# Patient Record
Sex: MALE | Race: WHITE
[De-identification: names, ages, dates, MRNs, and addresses within clinical notes are randomized per-mention and may not be internally consistent; named-entity substitution may affect disease eponyms.]

---

## 2018-08-06 ENCOUNTER — HOSPITAL ENCOUNTER (EMERGENCY)
Dept: HOSPITAL 8 - ED | Age: 68
Discharge: HOME | End: 2018-08-06
Payer: COMMERCIAL

## 2018-08-06 VITALS — WEIGHT: 206.79 LBS | HEIGHT: 72 IN | BODY MASS INDEX: 28.01 KG/M2

## 2018-08-06 VITALS — DIASTOLIC BLOOD PRESSURE: 94 MMHG | SYSTOLIC BLOOD PRESSURE: 148 MMHG

## 2018-08-06 DIAGNOSIS — Z88.8: ICD-10-CM

## 2018-08-06 DIAGNOSIS — Z88.0: ICD-10-CM
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
## 2018-08-06 DIAGNOSIS — Z87.891: ICD-10-CM

## 2018-08-06 DIAGNOSIS — I10: ICD-10-CM

## 2018-08-06 DIAGNOSIS — Z88.2: ICD-10-CM

## 2018-08-06 DIAGNOSIS — S90.571A: Primary | ICD-10-CM
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
## 2018-08-06 PROCEDURE — 99284 EMERGENCY DEPT VISIT MOD MDM: CPT

## 2020-07-25 ENCOUNTER — HOSPITAL ENCOUNTER (OUTPATIENT)
Dept: HOSPITAL 8 - ED | Age: 70
Setting detail: OBSERVATION
LOS: 1 days | Discharge: HOME | End: 2020-07-26
Attending: INTERNAL MEDICINE | Admitting: INTERNAL MEDICINE
Payer: COMMERCIAL

## 2020-07-25 VITALS — DIASTOLIC BLOOD PRESSURE: 94 MMHG | SYSTOLIC BLOOD PRESSURE: 151 MMHG

## 2020-07-25 VITALS — SYSTOLIC BLOOD PRESSURE: 134 MMHG | DIASTOLIC BLOOD PRESSURE: 86 MMHG

## 2020-07-25 VITALS — BODY MASS INDEX: 26.28 KG/M2 | WEIGHT: 194.01 LBS | HEIGHT: 72 IN

## 2020-07-25 DIAGNOSIS — Z87.891: ICD-10-CM

## 2020-07-25 DIAGNOSIS — N13.2: ICD-10-CM

## 2020-07-25 DIAGNOSIS — G47.33: ICD-10-CM

## 2020-07-25 DIAGNOSIS — M10.9: ICD-10-CM

## 2020-07-25 DIAGNOSIS — D80.1: ICD-10-CM

## 2020-07-25 DIAGNOSIS — Z87.442: ICD-10-CM

## 2020-07-25 DIAGNOSIS — Z79.899: ICD-10-CM

## 2020-07-25 DIAGNOSIS — Z86.61: ICD-10-CM

## 2020-07-25 DIAGNOSIS — R53.82: ICD-10-CM

## 2020-07-25 DIAGNOSIS — E03.9: ICD-10-CM

## 2020-07-25 DIAGNOSIS — Z88.0: ICD-10-CM

## 2020-07-25 DIAGNOSIS — Z03.818: Primary | ICD-10-CM

## 2020-07-25 DIAGNOSIS — E16.2: ICD-10-CM

## 2020-07-25 DIAGNOSIS — I10: ICD-10-CM

## 2020-07-25 LAB
ALBUMIN SERPL-MCNC: 3.5 G/DL (ref 3.4–5)
ALP SERPL-CCNC: 74 U/L (ref 45–117)
ALT SERPL-CCNC: 29 U/L (ref 12–78)
ANION GAP SERPL CALC-SCNC: 1 MMOL/L (ref 5–15)
BASOPHILS # BLD AUTO: 0.02 X10^3/UL (ref 0–0.1)
BASOPHILS NFR BLD AUTO: 0 % (ref 0–1)
BILIRUB SERPL-MCNC: 1 MG/DL (ref 0.2–1)
CALCIUM SERPL-MCNC: 8.8 MG/DL (ref 8.5–10.1)
CHLORIDE SERPL-SCNC: 110 MMOL/L (ref 98–107)
CREAT SERPL-MCNC: 0.82 MG/DL (ref 0.7–1.3)
EOSINOPHIL # BLD AUTO: 0.15 X10^3/UL (ref 0–0.4)
EOSINOPHIL NFR BLD AUTO: 3 % (ref 1–7)
ERYTHROCYTE [DISTWIDTH] IN BLOOD BY AUTOMATED COUNT: 14.5 % (ref 9.4–14.8)
LYMPHOCYTES # BLD AUTO: 0.79 X10^3/UL (ref 1–3.4)
LYMPHOCYTES NFR BLD AUTO: 15 % (ref 22–44)
MCH RBC QN AUTO: 31.7 PG (ref 27.5–34.5)
MCHC RBC AUTO-ENTMCNC: 33 G/DL (ref 33.2–36.2)
MCV RBC AUTO: 96.2 FL (ref 81–97)
MD: NO
MICROSCOPIC: (no result)
MONOCYTES # BLD AUTO: 0.51 X10^3/UL (ref 0.2–0.8)
MONOCYTES NFR BLD AUTO: 10 % (ref 2–9)
NEUTROPHILS # BLD AUTO: 3.77 X10^3/UL (ref 1.8–6.8)
NEUTROPHILS NFR BLD AUTO: 72 % (ref 42–75)
PLATELET # BLD AUTO: 182 X10^3/UL (ref 130–400)
PMV BLD AUTO: 7.1 FL (ref 7.4–10.4)
PROT SERPL-MCNC: 6.7 G/DL (ref 6.4–8.2)
RBC # BLD AUTO: 4.53 X10^6/UL (ref 4.38–5.82)

## 2020-07-25 PROCEDURE — 96365 THER/PROPH/DIAG IV INF INIT: CPT

## 2020-07-25 PROCEDURE — G0378 HOSPITAL OBSERVATION PER HR: HCPCS

## 2020-07-25 PROCEDURE — 36415 COLL VENOUS BLD VENIPUNCTURE: CPT

## 2020-07-25 PROCEDURE — C1769 GUIDE WIRE: HCPCS

## 2020-07-25 PROCEDURE — 87086 URINE CULTURE/COLONY COUNT: CPT

## 2020-07-25 PROCEDURE — 93005 ELECTROCARDIOGRAM TRACING: CPT

## 2020-07-25 PROCEDURE — 81001 URINALYSIS AUTO W/SCOPE: CPT

## 2020-07-25 PROCEDURE — 87635 SARS-COV-2 COVID-19 AMP PRB: CPT

## 2020-07-25 PROCEDURE — 52356 CYSTO/URETERO W/LITHOTRIPSY: CPT

## 2020-07-25 PROCEDURE — 80053 COMPREHEN METABOLIC PANEL: CPT

## 2020-07-25 PROCEDURE — 76000 FLUOROSCOPY <1 HR PHYS/QHP: CPT

## 2020-07-25 PROCEDURE — 74176 CT ABD & PELVIS W/O CONTRAST: CPT

## 2020-07-25 PROCEDURE — C2617 STENT, NON-COR, TEM W/O DEL: HCPCS

## 2020-07-25 PROCEDURE — 74018 RADEX ABDOMEN 1 VIEW: CPT

## 2020-07-25 PROCEDURE — 99285 EMERGENCY DEPT VISIT HI MDM: CPT

## 2020-07-25 PROCEDURE — 85025 COMPLETE CBC W/AUTO DIFF WBC: CPT

## 2020-07-25 RX ADMIN — THYROID, PORCINE SCH MG: 30 TABLET ORAL at 21:41

## 2020-07-25 RX ADMIN — CARVEDILOL SCH MG: 12.5 TABLET, FILM COATED ORAL at 21:42

## 2020-07-25 RX ADMIN — HYDROCODONE BITARTRATE AND ACETAMINOPHEN PRN TAB: 5; 325 TABLET ORAL at 23:29

## 2020-07-26 VITALS — SYSTOLIC BLOOD PRESSURE: 111 MMHG | DIASTOLIC BLOOD PRESSURE: 70 MMHG

## 2020-07-26 VITALS — DIASTOLIC BLOOD PRESSURE: 79 MMHG | SYSTOLIC BLOOD PRESSURE: 129 MMHG

## 2020-07-26 VITALS — SYSTOLIC BLOOD PRESSURE: 118 MMHG | DIASTOLIC BLOOD PRESSURE: 66 MMHG

## 2020-07-26 VITALS — DIASTOLIC BLOOD PRESSURE: 68 MMHG | SYSTOLIC BLOOD PRESSURE: 109 MMHG

## 2020-07-26 VITALS — DIASTOLIC BLOOD PRESSURE: 69 MMHG | SYSTOLIC BLOOD PRESSURE: 113 MMHG

## 2020-07-26 LAB
ALBUMIN SERPL-MCNC: 3.6 G/DL (ref 3.4–5)
ALP SERPL-CCNC: 82 U/L (ref 45–117)
ALT SERPL-CCNC: 32 U/L (ref 12–78)
ANION GAP SERPL CALC-SCNC: 9 MMOL/L (ref 5–15)
BASOPHILS # BLD AUTO: 0 X10^3/UL (ref 0–0.1)
BASOPHILS NFR BLD AUTO: 0 % (ref 0–1)
BILIRUB SERPL-MCNC: 0.9 MG/DL (ref 0.2–1)
CALCIUM SERPL-MCNC: 8.9 MG/DL (ref 8.5–10.1)
CHLORIDE SERPL-SCNC: 106 MMOL/L (ref 98–107)
CREAT SERPL-MCNC: 1.06 MG/DL (ref 0.7–1.3)
EOSINOPHIL # BLD AUTO: 0 X10^3/UL (ref 0–0.4)
EOSINOPHIL NFR BLD AUTO: 0 % (ref 1–7)
ERYTHROCYTE [DISTWIDTH] IN BLOOD BY AUTOMATED COUNT: 14.7 % (ref 9.4–14.8)
LYMPHOCYTES # BLD AUTO: 0.43 X10^3/UL (ref 1–3.4)
LYMPHOCYTES NFR BLD AUTO: 6 % (ref 22–44)
MCH RBC QN AUTO: 31.8 PG (ref 27.5–34.5)
MCHC RBC AUTO-ENTMCNC: 33.3 G/DL (ref 33.2–36.2)
MCV RBC AUTO: 95.5 FL (ref 81–97)
MD: NO
MONOCYTES # BLD AUTO: 0.07 X10^3/UL (ref 0.2–0.8)
MONOCYTES NFR BLD AUTO: 1 % (ref 2–9)
NEUTROPHILS # BLD AUTO: 6.83 X10^3/UL (ref 1.8–6.8)
NEUTROPHILS NFR BLD AUTO: 93 % (ref 42–75)
PLATELET # BLD AUTO: 176 X10^3/UL (ref 130–400)
PMV BLD AUTO: 7.7 FL (ref 7.4–10.4)
PROT SERPL-MCNC: 7.2 G/DL (ref 6.4–8.2)
RBC # BLD AUTO: 4.9 X10^6/UL (ref 4.38–5.82)

## 2020-07-26 RX ADMIN — HYDROCODONE BITARTRATE AND ACETAMINOPHEN PRN TAB: 5; 325 TABLET ORAL at 00:02

## 2020-07-26 RX ADMIN — THYROID, PORCINE SCH MG: 30 TABLET ORAL at 08:10

## 2020-07-26 RX ADMIN — HYDROCODONE BITARTRATE AND ACETAMINOPHEN PRN TAB: 5; 325 TABLET ORAL at 04:05

## 2020-07-26 RX ADMIN — CARVEDILOL SCH MG: 12.5 TABLET, FILM COATED ORAL at 08:11

## 2020-08-12 ENCOUNTER — HOSPITAL ENCOUNTER (EMERGENCY)
Dept: HOSPITAL 8 - ED | Age: 70
Discharge: HOME | End: 2020-08-12
Payer: COMMERCIAL

## 2020-08-12 VITALS — HEIGHT: 72 IN | WEIGHT: 200.4 LBS | BODY MASS INDEX: 27.14 KG/M2

## 2020-08-12 VITALS — SYSTOLIC BLOOD PRESSURE: 122 MMHG | DIASTOLIC BLOOD PRESSURE: 77 MMHG

## 2020-08-12 DIAGNOSIS — I10: ICD-10-CM

## 2020-08-12 DIAGNOSIS — I49.3: ICD-10-CM

## 2020-08-12 DIAGNOSIS — N23: ICD-10-CM

## 2020-08-12 DIAGNOSIS — R11.2: ICD-10-CM

## 2020-08-12 DIAGNOSIS — N20.0: Primary | ICD-10-CM

## 2020-08-12 DIAGNOSIS — Z90.89: ICD-10-CM

## 2020-08-12 DIAGNOSIS — R30.0: ICD-10-CM

## 2020-08-12 LAB
ALBUMIN SERPL-MCNC: 4 G/DL (ref 3.4–5)
ALP SERPL-CCNC: 76 U/L (ref 45–117)
ALT SERPL-CCNC: 35 U/L (ref 12–78)
ANION GAP SERPL CALC-SCNC: 4 MMOL/L (ref 5–15)
BASOPHILS # BLD AUTO: 0 X10^3/UL (ref 0–0.1)
BASOPHILS NFR BLD AUTO: 0 % (ref 0–1)
BILIRUB SERPL-MCNC: 1.2 MG/DL (ref 0.2–1)
CALCIUM SERPL-MCNC: 9.1 MG/DL (ref 8.5–10.1)
CHLORIDE SERPL-SCNC: 110 MMOL/L (ref 98–107)
CREAT SERPL-MCNC: 1.24 MG/DL (ref 0.7–1.3)
EOSINOPHIL # BLD AUTO: 0.03 X10^3/UL (ref 0–0.4)
EOSINOPHIL NFR BLD AUTO: 1 % (ref 1–7)
ERYTHROCYTE [DISTWIDTH] IN BLOOD BY AUTOMATED COUNT: 14.5 % (ref 9.4–14.8)
LYMPHOCYTES # BLD AUTO: 0.45 X10^3/UL (ref 1–3.4)
LYMPHOCYTES NFR BLD AUTO: 7 % (ref 22–44)
MCH RBC QN AUTO: 31.4 PG (ref 27.5–34.5)
MCHC RBC AUTO-ENTMCNC: 32.7 G/DL (ref 33.2–36.2)
MCV RBC AUTO: 95.8 FL (ref 81–97)
MD: NO
MICROSCOPIC: (no result)
MONOCYTES # BLD AUTO: 0.3 X10^3/UL (ref 0.2–0.8)
MONOCYTES NFR BLD AUTO: 4 % (ref 2–9)
NEUTROPHILS # BLD AUTO: 6.1 X10^3/UL (ref 1.8–6.8)
NEUTROPHILS NFR BLD AUTO: 89 % (ref 42–75)
PLATELET # BLD AUTO: 176 X10^3/UL (ref 130–400)
PMV BLD AUTO: 6.9 FL (ref 7.4–10.4)
PROT SERPL-MCNC: 7.4 G/DL (ref 6.4–8.2)
RBC # BLD AUTO: 4.6 X10^6/UL (ref 4.38–5.82)

## 2020-08-12 PROCEDURE — 83690 ASSAY OF LIPASE: CPT

## 2020-08-12 PROCEDURE — 80053 COMPREHEN METABOLIC PANEL: CPT

## 2020-08-12 PROCEDURE — 81001 URINALYSIS AUTO W/SCOPE: CPT

## 2020-08-12 PROCEDURE — 93005 ELECTROCARDIOGRAM TRACING: CPT

## 2020-08-12 PROCEDURE — 99284 EMERGENCY DEPT VISIT MOD MDM: CPT

## 2020-08-12 PROCEDURE — 96375 TX/PRO/DX INJ NEW DRUG ADDON: CPT

## 2020-08-12 PROCEDURE — 96374 THER/PROPH/DIAG INJ IV PUSH: CPT

## 2020-08-12 PROCEDURE — 36415 COLL VENOUS BLD VENIPUNCTURE: CPT

## 2020-08-12 PROCEDURE — 85025 COMPLETE CBC W/AUTO DIFF WBC: CPT

## 2020-08-12 NOTE — NUR
Pt resting in gurney, no needs expressed, pain level 5/10 post medication 
administration per MAR. Pt passed kidney stone in urine cup. Will continue to 
monitor.

## 2020-08-12 NOTE — NUR
Pt in [facility]. Paramedic student bedside with RN administering 
medication per MAR. No needs expressed by pt at this time. Will continue to 
monitor.

## 2020-08-12 NOTE — NUR
Patient/Caregiver given discharge instructions and they have confirmed that 
they understand the instructions.  Patient ambulatory with steady gait. Pt 
verbalized d/c instructions and is calling a ride for transportation home.

## 2021-01-01 ENCOUNTER — HOSPITAL ENCOUNTER (OUTPATIENT)
Dept: LAB | Facility: MEDICAL CENTER | Age: 71
End: 2021-10-20
Attending: INTERNAL MEDICINE
Payer: COMMERCIAL

## 2021-01-01 ENCOUNTER — HOSPITAL ENCOUNTER (OUTPATIENT)
Dept: RADIOLOGY | Facility: MEDICAL CENTER | Age: 71
End: 2021-03-31
Attending: INTERNAL MEDICINE
Payer: COMMERCIAL

## 2021-01-01 ENCOUNTER — TELEPHONE (OUTPATIENT)
Dept: URGENT CARE | Facility: PHYSICIAN GROUP | Age: 71
End: 2021-01-01

## 2021-01-01 ENCOUNTER — HOSPITAL ENCOUNTER (OUTPATIENT)
Dept: LAB | Facility: MEDICAL CENTER | Age: 71
End: 2021-02-16
Attending: INTERNAL MEDICINE
Payer: COMMERCIAL

## 2021-01-01 ENCOUNTER — HOSPITAL ENCOUNTER (OUTPATIENT)
Dept: RADIOLOGY | Facility: MEDICAL CENTER | Age: 71
End: 2021-06-21
Attending: PHYSICIAN ASSISTANT
Payer: COMMERCIAL

## 2021-01-01 ENCOUNTER — OFFICE VISIT (OUTPATIENT)
Dept: URGENT CARE | Facility: PHYSICIAN GROUP | Age: 71
End: 2021-01-01
Payer: COMMERCIAL

## 2021-01-01 ENCOUNTER — HOSPITAL ENCOUNTER (OUTPATIENT)
Dept: LAB | Facility: MEDICAL CENTER | Age: 71
End: 2021-03-31
Attending: INTERNAL MEDICINE
Payer: COMMERCIAL

## 2021-01-01 VITALS
TEMPERATURE: 98.9 F | HEIGHT: 72 IN | WEIGHT: 240 LBS | SYSTOLIC BLOOD PRESSURE: 108 MMHG | BODY MASS INDEX: 32.51 KG/M2 | OXYGEN SATURATION: 100 % | RESPIRATION RATE: 16 BRPM | DIASTOLIC BLOOD PRESSURE: 56 MMHG | HEART RATE: 76 BPM

## 2021-01-01 DIAGNOSIS — N50.811 PAIN IN RIGHT TESTICLE: ICD-10-CM

## 2021-01-01 DIAGNOSIS — R10.9 RIGHT FLANK PAIN: ICD-10-CM

## 2021-01-01 DIAGNOSIS — M15.0 PRIMARY GENERALIZED HYPERTROPHIC OSTEOARTHROSIS: ICD-10-CM

## 2021-01-01 DIAGNOSIS — Z23 NEED FOR VACCINATION: ICD-10-CM

## 2021-01-01 LAB
(HCYS)2 SERPL-SCNC: <2 UMOL/L
(HCYS)2 SERPL-SCNC: <2 UMOL/L
25(OH)D3 SERPL-MCNC: 86 NG/ML (ref 30–100)
A-AMINOBUTYR SERPL-SCNC: 18 UMOL/L
A-AMINOBUTYR SERPL-SCNC: 24 UMOL/L
A-AMINOBUTYR/CREAT UR-RTO: <10 UMOL/G CRT
A-AMINOBUTYR/CREAT UR-RTO: <10 UMOL/G CRT
AAA SERPL-SCNC: <2 UMOL/L
AAA SERPL-SCNC: <2 UMOL/L
AAA/CREAT UR-RTO: 17 UMOL/G CRT
AAA/CREAT UR-RTO: 18 UMOL/G CRT
ALANINE SERPL-SCNC: 439 UMOL/L (ref 160–530)
ALANINE SERPL-SCNC: 465 UMOL/L (ref 160–530)
ALANINE/CREAT UR-RTO: 119 UMOL/G CRT (ref 60–500)
ALANINE/CREAT UR-RTO: 170 UMOL/G CRT (ref 60–500)
ALBUMIN SERPL BCP-MCNC: 4.2 G/DL (ref 3.2–4.9)
ALBUMIN SERPL BCP-MCNC: 4.3 G/DL (ref 3.2–4.9)
ALBUMIN SERPL BCP-MCNC: 4.3 G/DL (ref 3.2–4.9)
ALBUMIN/GLOB SERPL: 1.3 G/DL
ALBUMIN/GLOB SERPL: 1.4 G/DL
ALBUMIN/GLOB SERPL: 1.4 G/DL
ALLOISOLEUCINE SERPL-SCNC: <2 UMOL/L
ALLOISOLEUCINE SERPL-SCNC: <2 UMOL/L
ALP SERPL-CCNC: 100 U/L (ref 30–99)
ALP SERPL-CCNC: 94 U/L (ref 30–99)
ALP SERPL-CCNC: 99 U/L (ref 30–99)
ALT SERPL-CCNC: 28 U/L (ref 2–50)
ALT SERPL-CCNC: 29 U/L (ref 2–50)
ALT SERPL-CCNC: 42 U/L (ref 2–50)
AMINO ACID PAT SERPL-IMP: ABNORMAL
AMINO ACID PAT SERPL-IMP: ABNORMAL
AMINO ACID PAT UR-IMP: NORMAL
AMINO ACID PAT UR-IMP: NORMAL
ANA INTERPRETIVE COMMENT Q5143: ABNORMAL
ANA PATTERN Q5144: ABNORMAL
ANA TITER Q5145: ABNORMAL
ANION GAP SERPL CALC-SCNC: 10 MMOL/L (ref 7–16)
ANION GAP SERPL CALC-SCNC: 8 MMOL/L (ref 7–16)
ANION GAP SERPL CALC-SCNC: 9 MMOL/L (ref 7–16)
ANSERINE SERPL-SCNC: <5 UMOL/L
ANSERINE SERPL-SCNC: <5 UMOL/L
ANSERINE/CREAT UR-RTO: <25 UMOL/G CRT
ANSERINE/CREAT UR-RTO: <25 UMOL/G CRT
ANTINUCLEAR ANTIBODY (ANA), HEP-2, IGG Q5142: DETECTED
APPEARANCE UR: NORMAL
ARGININE SERPL-SCNC: 56 UMOL/L (ref 35–125)
ARGININE SERPL-SCNC: 70 UMOL/L (ref 35–125)
ARGININE/CREAT UR-RTO: 27 UMOL/G CRT
ARGININE/CREAT UR-RTO: <25 UMOL/G CRT
ARGININOSUCCINATE SERPL-SCNC: <2 UMOL/L
ARGININOSUCCINATE SERPL-SCNC: <2 UMOL/L
ARGININOSUCCINATE/CREAT UR-RTO: 10 UMOL/G CRT
ARGININOSUCCINATE/CREAT UR-RTO: <10 UMOL/G CRT
ASPARAGINE SERPL-SCNC: 42 UMOL/L (ref 20–80)
ASPARAGINE SERPL-SCNC: 54 UMOL/L (ref 20–80)
ASPARAGINE/CREAT UR-RTO: 27 UMOL/G CRT (ref 25–180)
ASPARAGINE/CREAT UR-RTO: 37 UMOL/G CRT (ref 25–180)
ASPARTATE SERPL-SCNC: <5 UMOL/L
ASPARTATE SERPL-SCNC: <5 UMOL/L
ASPARTATE/CREAT UR-RTO: <25 UMOL/G CRT
ASPARTATE/CREAT UR-RTO: <25 UMOL/G CRT
AST SERPL-CCNC: 20 U/L (ref 12–45)
AST SERPL-CCNC: 29 U/L (ref 12–45)
AST SERPL-CCNC: 30 U/L (ref 12–45)
B-AIB SERPL-SCNC: <5 UMOL/L
B-AIB SERPL-SCNC: <5 UMOL/L
B-AIB/CREAT UR-RTO: 112 UMOL/G CRT
B-AIB/CREAT UR-RTO: 71 UMOL/G CRT
B-ALANINE SERPL-SCNC: <25 UMOL/L
B-ALANINE SERPL-SCNC: <25 UMOL/L
B-ALANINE/CREAT UR-RTO: <125 UMOL/G CRT
B-ALANINE/CREAT UR-RTO: <125 UMOL/G CRT
BASOPHILS # BLD AUTO: 0.4 % (ref 0–1.8)
BASOPHILS # BLD AUTO: 0.4 % (ref 0–1.8)
BASOPHILS # BLD AUTO: 0.8 % (ref 0–1.8)
BASOPHILS # BLD: 0.02 K/UL (ref 0–0.12)
BASOPHILS # BLD: 0.02 K/UL (ref 0–0.12)
BASOPHILS # BLD: 0.04 K/UL (ref 0–0.12)
BILIRUB SERPL-MCNC: 1.1 MG/DL (ref 0.1–1.5)
BILIRUB SERPL-MCNC: 1.2 MG/DL (ref 0.1–1.5)
BILIRUB SERPL-MCNC: 1.3 MG/DL (ref 0.1–1.5)
BILIRUB UR STRIP-MCNC: NEGATIVE MG/DL
BUN SERPL-MCNC: 15 MG/DL (ref 8–22)
BUN SERPL-MCNC: 19 MG/DL (ref 8–22)
BUN SERPL-MCNC: 19 MG/DL (ref 8–22)
C3 SERPL-MCNC: 93.2 MG/DL (ref 87–200)
C3 SERPL-MCNC: 94.9 MG/DL (ref 87–200)
C3 SERPL-MCNC: 98.5 MG/DL (ref 87–200)
C4 SERPL-MCNC: 6.7 MG/DL (ref 19–52)
C4 SERPL-MCNC: 7 MG/DL (ref 19–52)
C4 SERPL-MCNC: 7.4 MG/DL (ref 19–52)
CALCIUM SERPL-MCNC: 9.3 MG/DL (ref 8.5–10.5)
CALCIUM SERPL-MCNC: 9.5 MG/DL (ref 8.5–10.5)
CALCIUM SERPL-MCNC: 9.6 MG/DL (ref 8.5–10.5)
CHLORIDE SERPL-SCNC: 103 MMOL/L (ref 96–112)
CHLORIDE SERPL-SCNC: 105 MMOL/L (ref 96–112)
CHLORIDE SERPL-SCNC: 105 MMOL/L (ref 96–112)
CHOLEST SERPL-MCNC: 145 MG/DL
CHOLEST SERPL-MCNC: 178 MG/DL (ref 100–199)
CITRULLINE SERPL-SCNC: 31 UMOL/L (ref 10–45)
CITRULLINE SERPL-SCNC: 32 UMOL/L (ref 10–45)
CITRULLINE/CREAT UR-RTO: <5 UMOL/G CRT
CITRULLINE/CREAT UR-RTO: <5 UMOL/G CRT
CO2 SERPL-SCNC: 24 MMOL/L (ref 20–33)
CO2 SERPL-SCNC: 25 MMOL/L (ref 20–33)
CO2 SERPL-SCNC: 27 MMOL/L (ref 20–33)
COLOR UR AUTO: NORMAL
CREAT SERPL-MCNC: 0.95 MG/DL (ref 0.5–1.4)
CREAT SERPL-MCNC: 0.96 MG/DL (ref 0.5–1.4)
CREAT SERPL-MCNC: 0.97 MG/DL (ref 0.5–1.4)
CREATININE URINE 40226: 154 MG/DL
CREATININE URINE 40226: 62 MG/DL
CRP SERPL HS-MCNC: <0.3 MG/DL (ref 0–0.75)
CYSTATHIONIN SERPL-SCNC: <5 UMOL/L
CYSTATHIONIN SERPL-SCNC: <5 UMOL/L
CYSTATHIONIN/CREAT UR-RTO: <25 UMOL/G CRT
CYSTATHIONIN/CREAT UR-RTO: <25 UMOL/G CRT
CYSTINE SERPL-SCNC: 55 UMOL/L (ref 10–65)
CYSTINE SERPL-SCNC: 56 UMOL/L (ref 10–65)
CYSTINE/CREAT UR-RTO: 33 UMOL/G CRT
CYSTINE/CREAT UR-RTO: 36 UMOL/G CRT
DSDNA AB TITR SER CLIF: 31 IU (ref 0–24)
DSDNA AB TITR SER CLIF: DETECTED {TITER}
DSDNA AB TITR SER CLIF: NORMAL {TITER}
DSDNA IGG TITR SER CLIF: ABNORMAL {TITER}
DSDNA IGG TITR SER CLIF: NORMAL {TITER}
EBV EA-D IGG SER-ACNC: 5.5 U/ML (ref 0–10.9)
EBV NA IGG SER IA-ACNC: 380 U/ML (ref 0–21.9)
EBV VCA IGG SER IA-ACNC: 237 U/ML (ref 0–21.9)
ENA SCL70 IGG SER QL: 1 AU/ML (ref 0–40)
ENA SCL70 IGG SER QL: 1 AU/ML (ref 0–40)
ENA SCL70 IGG SER QL: 4 AU/ML (ref 0–40)
ENA SM IGG SER-ACNC: 4 AU/ML (ref 0–40)
ENA SM IGG SER-ACNC: 5 AU/ML (ref 0–40)
ENA SM IGG SER-ACNC: 5 AU/ML (ref 0–40)
ENA SS-B IGG SER IA-ACNC: 0 AU/ML (ref 0–40)
EOSINOPHIL # BLD AUTO: 0.12 K/UL (ref 0–0.51)
EOSINOPHIL # BLD AUTO: 0.14 K/UL (ref 0–0.51)
EOSINOPHIL # BLD AUTO: 0.14 K/UL (ref 0–0.51)
EOSINOPHIL NFR BLD: 2.4 % (ref 0–6.9)
EOSINOPHIL NFR BLD: 2.8 % (ref 0–6.9)
EOSINOPHIL NFR BLD: 3.1 % (ref 0–6.9)
ERYTHROCYTE [DISTWIDTH] IN BLOOD BY AUTOMATED COUNT: 53.5 FL (ref 35.9–50)
ERYTHROCYTE [DISTWIDTH] IN BLOOD BY AUTOMATED COUNT: 53.8 FL (ref 35.9–50)
ERYTHROCYTE [DISTWIDTH] IN BLOOD BY AUTOMATED COUNT: 55.8 FL (ref 35.9–50)
ERYTHROCYTE [SEDIMENTATION RATE] IN BLOOD BY WESTERGREN METHOD: 1 MM/HOUR (ref 0–20)
ERYTHROCYTE [SEDIMENTATION RATE] IN BLOOD BY WESTERGREN METHOD: 27 MM/HOUR (ref 0–20)
ERYTHROCYTE [SEDIMENTATION RATE] IN BLOOD BY WESTERGREN METHOD: <1 MM/HOUR (ref 0–20)
ETHANOLAMINE SERPL-SCNC: 10 UMOL/L
ETHANOLAMINE SERPL-SCNC: 9 UMOL/L
ETHANOLAMINE/CREAT UR-RTO: 286 UMOL/G CRT (ref 100–510)
ETHANOLAMINE/CREAT UR-RTO: 296 UMOL/G CRT (ref 100–510)
FASTING STATUS PATIENT QL REPORTED: NORMAL
GABA SERPL-SCNC: <5 UMOL/L
GABA SERPL-SCNC: <5 UMOL/L
GABA/CREAT UR-RTO: <25 UMOL/G CRT
GABA/CREAT UR-RTO: <25 UMOL/G CRT
GHRH SERPL-MCNC: 0.09 NG/ML (ref 0.05–3)
GHRH SERPL-MCNC: 0.21 NG/ML (ref 0.05–3)
GLOBULIN SER CALC-MCNC: 2.9 G/DL (ref 1.9–3.5)
GLOBULIN SER CALC-MCNC: 3 G/DL (ref 1.9–3.5)
GLOBULIN SER CALC-MCNC: 3.2 G/DL (ref 1.9–3.5)
GLUCOSE SERPL-MCNC: 62 MG/DL (ref 65–99)
GLUCOSE SERPL-MCNC: 76 MG/DL (ref 65–99)
GLUCOSE SERPL-MCNC: 92 MG/DL (ref 65–99)
GLUCOSE UR STRIP.AUTO-MCNC: NEGATIVE MG/DL
GLUTAMATE SERPL-SCNC: 40 UMOL/L (ref 15–130)
GLUTAMATE SERPL-SCNC: 74 UMOL/L (ref 15–130)
GLUTAMATE/CREAT UR-RTO: <25 UMOL/G CRT
GLUTAMATE/CREAT UR-RTO: <25 UMOL/G CRT
GLUTAMINE SERPL-SCNC: 558 UMOL/L (ref 380–680)
GLUTAMINE SERPL-SCNC: 590 UMOL/L (ref 380–680)
GLUTAMINE/CREAT UR-RTO: 212 UMOL/G CRT (ref 100–665)
GLUTAMINE/CREAT UR-RTO: 214 UMOL/G CRT (ref 100–665)
GLYCINE SERPL-SCNC: 196 UMOL/L (ref 140–420)
GLYCINE SERPL-SCNC: 231 UMOL/L (ref 140–420)
GLYCINE/CREAT UR-RTO: 551 UMOL/G CRT (ref 230–3510)
GLYCINE/CREAT UR-RTO: 681 UMOL/G CRT (ref 230–3510)
HCT VFR BLD AUTO: 46.6 % (ref 42–52)
HCT VFR BLD AUTO: 51.1 % (ref 42–52)
HCT VFR BLD AUTO: 55.1 % (ref 42–52)
HDL PARTICAL NO Q4363: 24.1 UMOL/L
HDL SIZE Q4361: 8.7 NM
HDLC SERPL-MCNC: 35 MG/DL (ref 40–59)
HDLC SERPL-MCNC: 36 MG/DL
HGB BLD-MCNC: 15.7 G/DL (ref 14–18)
HGB BLD-MCNC: 17 G/DL (ref 14–18)
HGB BLD-MCNC: 17.8 G/DL (ref 14–18)
HISTIDINE SERPL-SCNC: 61 UMOL/L (ref 50–130)
HISTIDINE SERPL-SCNC: 89 UMOL/L (ref 50–130)
HISTIDINE/CREAT UR-RTO: 145 UMOL/G CRT (ref 80–1130)
HISTIDINE/CREAT UR-RTO: 240 UMOL/G CRT (ref 80–1130)
HLD.LARGE SERPL-SCNC: <2.8 UMOL/L
HOMOCITRULLINE SERPL-SCNC: <5 UMOL/L
HOMOCITRULLINE SERPL-SCNC: <5 UMOL/L
HOMOCITRULLINE/CREAT UR-RTO: <25 UMOL/G CRT
HOMOCITRULLINE/CREAT UR-RTO: <25 UMOL/G CRT
IGA SERPL-MCNC: 163 MG/DL (ref 68–408)
IGA SERPL-MCNC: 170 MG/DL (ref 68–408)
IGG SERPL-MCNC: 1118 MG/DL (ref 768–1632)
IGG SERPL-MCNC: 1217 MG/DL (ref 768–1632)
IGG1 SER-MCNC: 830 MG/DL (ref 240–1118)
IGG1 SER-MCNC: 892 MG/DL (ref 240–1118)
IGG2 SER-MCNC: 131 MG/DL (ref 124–549)
IGG2 SER-MCNC: 170 MG/DL (ref 124–549)
IGG3 SER-MCNC: 34 MG/DL (ref 21–134)
IGG3 SER-MCNC: 38 MG/DL (ref 21–134)
IGG4 SER-MCNC: 1 MG/DL (ref 1–123)
IGG4 SER-MCNC: 2 MG/DL (ref 1–123)
IGM SERPL-MCNC: 102 MG/DL (ref 35–263)
IGM SERPL-MCNC: 96 MG/DL (ref 35–263)
IMM GRANULOCYTES # BLD AUTO: 0.05 K/UL (ref 0–0.11)
IMM GRANULOCYTES # BLD AUTO: 0.05 K/UL (ref 0–0.11)
IMM GRANULOCYTES # BLD AUTO: 0.07 K/UL (ref 0–0.11)
IMM GRANULOCYTES NFR BLD AUTO: 1 % (ref 0–0.9)
IMM GRANULOCYTES NFR BLD AUTO: 1.1 % (ref 0–0.9)
IMM GRANULOCYTES NFR BLD AUTO: 1.4 % (ref 0–0.9)
ISOLEUCINE SERPL-SCNC: 62 UMOL/L (ref 30–120)
ISOLEUCINE SERPL-SCNC: 74 UMOL/L (ref 30–120)
ISOLEUCINE/CREAT UR-RTO: <25 UMOL/G CRT
ISOLEUCINE/CREAT UR-RTO: <25 UMOL/G CRT
KETONES UR STRIP.AUTO-MCNC: NEGATIVE MG/DL
L VLDL PART NO Q4357: 3.7 NMOL/L
LDL SERPL QN: 20.7 NM
LDL SERPL-SCNC: 1027 NMOL/L
LDL SMALL SERPL-SCNC: 531 NMOL/L
LDLC SERPL CALC-MCNC: 121 MG/DL
LDLC SERPL CALC-MCNC: 93 MG/DL
LEUCINE SERPL-SCNC: 131 UMOL/L (ref 60–180)
LEUCINE SERPL-SCNC: 135 UMOL/L (ref 60–180)
LEUCINE/CREAT UR-RTO: 11 UMOL/G CRT
LEUCINE/CREAT UR-RTO: 13 UMOL/G CRT
LEUKOCYTE ESTERASE UR QL STRIP.AUTO: NEGATIVE
LYMPHOCYTES # BLD AUTO: 0.59 K/UL (ref 1–4.8)
LYMPHOCYTES # BLD AUTO: 0.6 K/UL (ref 1–4.8)
LYMPHOCYTES # BLD AUTO: 0.6 K/UL (ref 1–4.8)
LYMPHOCYTES NFR BLD: 11.8 % (ref 22–41)
LYMPHOCYTES NFR BLD: 11.8 % (ref 22–41)
LYMPHOCYTES NFR BLD: 13.4 % (ref 22–41)
LYSINE SERPL-SCNC: 142 UMOL/L (ref 85–230)
LYSINE SERPL-SCNC: 158 UMOL/L (ref 85–230)
LYSINE/CREAT UR-RTO: 25 UMOL/G CRT
LYSINE/CREAT UR-RTO: 44 UMOL/G CRT
MCH RBC QN AUTO: 31.7 PG (ref 27–33)
MCH RBC QN AUTO: 31.7 PG (ref 27–33)
MCH RBC QN AUTO: 32.6 PG (ref 27–33)
MCHC RBC AUTO-ENTMCNC: 32.3 G/DL (ref 33.7–35.3)
MCHC RBC AUTO-ENTMCNC: 33.3 G/DL (ref 33.7–35.3)
MCHC RBC AUTO-ENTMCNC: 33.7 G/DL (ref 33.7–35.3)
MCV RBC AUTO: 95.2 FL (ref 81.4–97.8)
MCV RBC AUTO: 96.9 FL (ref 81.4–97.8)
MCV RBC AUTO: 98 FL (ref 81.4–97.8)
METHIONINE SERPL-SCNC: 27 UMOL/L (ref 15–40)
METHIONINE SERPL-SCNC: 31 UMOL/L (ref 15–40)
METHIONINE/CREAT UR-RTO: <10 UMOL/G CRT
METHIONINE/CREAT UR-RTO: <10 UMOL/G CRT
MITOCHONDRIA M2 IGG SER-ACNC: 10.2 UNITS (ref 0–24.9)
MITOCHONDRIA M2 IGG SER-ACNC: 10.3 UNITS (ref 0–24.9)
MITOCHONDRIA M2 IGG SER-ACNC: 9.8 UNITS (ref 0–24.9)
MONOCYTES # BLD AUTO: 0.43 K/UL (ref 0–0.85)
MONOCYTES # BLD AUTO: 0.47 K/UL (ref 0–0.85)
MONOCYTES # BLD AUTO: 0.49 K/UL (ref 0–0.85)
MONOCYTES NFR BLD AUTO: 9.3 % (ref 0–13.4)
MONOCYTES NFR BLD AUTO: 9.6 % (ref 0–13.4)
MONOCYTES NFR BLD AUTO: 9.8 % (ref 0–13.4)
NEUTROPHILS # BLD AUTO: 3.23 K/UL (ref 1.82–7.42)
NEUTROPHILS # BLD AUTO: 3.68 K/UL (ref 1.82–7.42)
NEUTROPHILS # BLD AUTO: 3.8 K/UL (ref 1.82–7.42)
NEUTROPHILS NFR BLD: 72.4 % (ref 44–72)
NEUTROPHILS NFR BLD: 73.8 % (ref 44–72)
NEUTROPHILS NFR BLD: 74.7 % (ref 44–72)
NITRITE UR QL STRIP.AUTO: NEGATIVE
NRBC # BLD AUTO: 0 K/UL
NRBC BLD-RTO: 0 /100 WBC
NUCLEAR IGG SER QL IA: DETECTED
OH-LYSINE SERPL-SCNC: <5 UMOL/L
OH-LYSINE SERPL-SCNC: <5 UMOL/L
OH-LYSINE/CREAT UR-RTO: <25 UMOL/G CRT
OH-LYSINE/CREAT UR-RTO: <25 UMOL/G CRT
OH-PROLINE SERPL-SCNC: 11 UMOL/L (ref 5–40)
OH-PROLINE SERPL-SCNC: 9 UMOL/L (ref 5–40)
OH-PROLINE/CREAT UR-RTO: <10 UMOL/G CRT
OH-PROLINE/CREAT UR-RTO: <10 UMOL/G CRT
ORNITHINE SERPL-SCNC: 71 UMOL/L (ref 25–110)
ORNITHINE SERPL-SCNC: 85 UMOL/L (ref 25–110)
ORNITHINE/CREAT UR-RTO: <25 UMOL/G CRT
ORNITHINE/CREAT UR-RTO: <25 UMOL/G CRT
PATHOLOGY STUDY: ABNORMAL
PCA IGG SER-ACNC: 2.1 UNITS (ref 0–24.9)
PCA IGG SER-ACNC: 2.3 UNITS (ref 0–24.9)
PCA IGG SER-ACNC: 2.4 UNITS (ref 0–24.9)
PH UR STRIP.AUTO: 7 [PH] (ref 5–8)
PHE SERPL-SCNC: 71 UMOL/L (ref 30–82)
PHE SERPL-SCNC: 73 UMOL/L (ref 30–82)
PHE/CREAT UR-RTO: 24 UMOL/G CRT (ref 15–85)
PHE/CREAT UR-RTO: 26 UMOL/G CRT (ref 15–85)
PLATELET # BLD AUTO: 149 K/UL (ref 164–446)
PLATELET # BLD AUTO: 152 K/UL (ref 164–446)
PLATELET # BLD AUTO: 175 K/UL (ref 164–446)
PMV BLD AUTO: 10.1 FL (ref 9–12.9)
PMV BLD AUTO: 9.7 FL (ref 9–12.9)
PMV BLD AUTO: 9.7 FL (ref 9–12.9)
POTASSIUM SERPL-SCNC: 4.2 MMOL/L (ref 3.6–5.5)
POTASSIUM SERPL-SCNC: 4.3 MMOL/L (ref 3.6–5.5)
POTASSIUM SERPL-SCNC: 4.4 MMOL/L (ref 3.6–5.5)
PROLINE SERPL-SCNC: 169 UMOL/L (ref 90–350)
PROLINE SERPL-SCNC: 172 UMOL/L (ref 90–350)
PROLINE/CREAT UR-RTO: <10 UMOL/G CRT
PROLINE/CREAT UR-RTO: <10 UMOL/G CRT
PROT SERPL-MCNC: 7.1 G/DL (ref 6–8.2)
PROT SERPL-MCNC: 7.3 G/DL (ref 6–8.2)
PROT SERPL-MCNC: 7.5 G/DL (ref 6–8.2)
PROT UR QL STRIP: 30 MG/DL
PSA SERPL-MCNC: 0.39 NG/ML (ref 0–4)
PSA SERPL-MCNC: 0.44 NG/ML (ref 0–4)
QORDR QORDR: NORMAL
RBC # BLD AUTO: 4.81 M/UL (ref 4.7–6.1)
RBC # BLD AUTO: 5.37 M/UL (ref 4.7–6.1)
RBC # BLD AUTO: 5.62 M/UL (ref 4.7–6.1)
RBC UR QL AUTO: NEGATIVE
RHEUMATOID FACT SER IA-ACNC: <10 IU/ML (ref 0–14)
SARCOSINE SERPL-SCNC: <5 UMOL/L
SARCOSINE SERPL-SCNC: <5 UMOL/L
SARCOSINE/CREAT UR-RTO: <25 UMOL/G CRT
SARCOSINE/CREAT UR-RTO: <25 UMOL/G CRT
SERINE SERPL-SCNC: 82 UMOL/L (ref 60–170)
SERINE SERPL-SCNC: 86 UMOL/L (ref 60–170)
SERINE/CREAT UR-RTO: 140 UMOL/G CRT (ref 90–470)
SERINE/CREAT UR-RTO: 140 UMOL/G CRT (ref 90–470)
SMA IGG SER-ACNC: 13 UNITS (ref 0–19)
SMA IGG SER-ACNC: 16 UNITS (ref 0–19)
SMA IGG SER-ACNC: 17 UNITS (ref 0–19)
SODIUM SERPL-SCNC: 138 MMOL/L (ref 135–145)
SODIUM SERPL-SCNC: 139 MMOL/L (ref 135–145)
SODIUM SERPL-SCNC: 139 MMOL/L (ref 135–145)
SP GR UR STRIP.AUTO: 1.02
SSA52 R0ENA AB IGG Q0420: 1 AU/ML (ref 0–40)
SSA52 R0ENA AB IGG Q0420: 1 AU/ML (ref 0–40)
SSA52 R0ENA AB IGG Q0420: 3 AU/ML (ref 0–40)
SSA60 R0ENA AB IGG Q0419: 0 AU/ML (ref 0–40)
SSA60 R0ENA AB IGG Q0419: 1 AU/ML (ref 0–40)
SSA60 R0ENA AB IGG Q0419: 2 AU/ML (ref 0–40)
STRIA MUS IGG SER QL IF: DETECTED
STRIATED MUSC IGG TITER Q4408: ABNORMAL
T4 FREE SERPL-MCNC: 1.02 NG/DL (ref 0.93–1.7)
T4 SERPL-MCNC: 6.6 UG/DL (ref 4–12)
TAURINE SERPL-SCNC: 44 UMOL/L (ref 30–130)
TAURINE SERPL-SCNC: 49 UMOL/L (ref 30–130)
TAURINE/CREAT UR-RTO: 384 UMOL/G CRT
TAURINE/CREAT UR-RTO: 61 UMOL/G CRT
TEST NAME 95000: NORMAL
TESTOST SERPL-MCNC: 406 NG/DL (ref 175–781)
TESTOST SERPL-MCNC: 466 NG/DL (ref 175–781)
THREONINE SERPL-SCNC: 100 UMOL/L (ref 60–190)
THREONINE SERPL-SCNC: 135 UMOL/L (ref 60–190)
THREONINE/CREAT UR-RTO: 39 UMOL/G CRT (ref 25–250)
THREONINE/CREAT UR-RTO: 62 UMOL/G CRT (ref 25–250)
THYROPEROXIDASE AB SERPL-ACNC: 12 IU/ML (ref 0–9)
THYROPEROXIDASE AB SERPL-ACNC: 14 IU/ML (ref 0–9)
THYROPEROXIDASE AB SERPL-ACNC: 14 IU/ML (ref 0–9)
TRIGL SERPL-MCNC: 105 MG/DL (ref 0–149)
TRIGL SERPL-MCNC: 84 MG/DL (ref 30–149)
TRYPTOPHAN SERPL-SCNC: 117 UMOL/L (ref 25–80)
TRYPTOPHAN SERPL-SCNC: 99 UMOL/L (ref 25–80)
TRYPTOPHAN/CREAT UR-RTO: 79 UMOL/G CRT (ref 15–95)
TRYPTOPHAN/CREAT UR-RTO: 90 UMOL/G CRT (ref 15–95)
TSH SERPL DL<=0.005 MIU/L-ACNC: 0.01 UIU/ML (ref 0.38–5.33)
TSH SERPL DL<=0.005 MIU/L-ACNC: 0.03 UIU/ML (ref 0.38–5.33)
TYROSINE SERPL-SCNC: 120 UMOL/L (ref 35–110)
TYROSINE SERPL-SCNC: 137 UMOL/L (ref 35–110)
TYROSINE/CREAT UR-RTO: 110 UMOL/G CRT (ref 15–150)
TYROSINE/CREAT UR-RTO: 96 UMOL/G CRT (ref 15–150)
U1 SNRNP IGG SER QL: 10
U1 SNRNP IGG SER QL: NORMAL
U1 SNRNP IGG SER QL: NORMAL
URATE SERPL-MCNC: 5 MG/DL (ref 2.5–8.3)
URATE SERPL-MCNC: 6.1 MG/DL (ref 2.5–8.3)
UROBILINOGEN UR STRIP-MCNC: 0.2 MG/DL
VALINE SERPL-SCNC: 200 UMOL/L (ref 120–320)
VALINE SERPL-SCNC: 228 UMOL/L (ref 120–320)
VALINE/CREAT UR-RTO: 18 UMOL/G CRT
VALINE/CREAT UR-RTO: 20 UMOL/G CRT
VLDL SIZE Q4362: 49.5 NM
WBC # BLD AUTO: 4.5 K/UL (ref 4.8–10.8)
WBC # BLD AUTO: 5 K/UL (ref 4.8–10.8)
WBC # BLD AUTO: 5.1 K/UL (ref 4.8–10.8)

## 2021-01-01 PROCEDURE — 82787 IGG 1 2 3 OR 4 EACH: CPT | Mod: 91

## 2021-01-01 PROCEDURE — 84550 ASSAY OF BLOOD/URIC ACID: CPT

## 2021-01-01 PROCEDURE — 86160 COMPLEMENT ANTIGEN: CPT

## 2021-01-01 PROCEDURE — 84403 ASSAY OF TOTAL TESTOSTERONE: CPT

## 2021-01-01 PROCEDURE — 86255 FLUORESCENT ANTIBODY SCREEN: CPT

## 2021-01-01 PROCEDURE — 83003 ASSAY GROWTH HORMONE (HGH): CPT

## 2021-01-01 PROCEDURE — 86256 FLUORESCENT ANTIBODY TITER: CPT | Mod: 91

## 2021-01-01 PROCEDURE — 99204 OFFICE O/P NEW MOD 45 MIN: CPT | Performed by: PHYSICIAN ASSISTANT

## 2021-01-01 PROCEDURE — 82787 IGG 1 2 3 OR 4 EACH: CPT

## 2021-01-01 PROCEDURE — 83516 IMMUNOASSAY NONANTIBODY: CPT

## 2021-01-01 PROCEDURE — 86665 EPSTEIN-BARR CAPSID VCA: CPT

## 2021-01-01 PROCEDURE — 84443 ASSAY THYROID STIM HORMONE: CPT

## 2021-01-01 PROCEDURE — 86235 NUCLEAR ANTIGEN ANTIBODY: CPT

## 2021-01-01 PROCEDURE — 85025 COMPLETE CBC W/AUTO DIFF WBC: CPT

## 2021-01-01 PROCEDURE — 86038 ANTINUCLEAR ANTIBODIES: CPT

## 2021-01-01 PROCEDURE — 81264 IGK REARRANGEABN CLONAL POP: CPT

## 2021-01-01 PROCEDURE — 82784 ASSAY IGA/IGD/IGG/IGM EACH: CPT

## 2021-01-01 PROCEDURE — 82139 AMINO ACIDS QUAN 6 OR MORE: CPT | Mod: 91

## 2021-01-01 PROCEDURE — 80061 LIPID PANEL: CPT

## 2021-01-01 PROCEDURE — 80053 COMPREHEN METABOLIC PANEL: CPT

## 2021-01-01 PROCEDURE — 86039 ANTINUCLEAR ANTIBODIES (ANA): CPT

## 2021-01-01 PROCEDURE — 84436 ASSAY OF TOTAL THYROXINE: CPT

## 2021-01-01 PROCEDURE — 369999 HCHG MISC LAB CHARGE

## 2021-01-01 PROCEDURE — 86140 C-REACTIVE PROTEIN: CPT

## 2021-01-01 PROCEDURE — 85652 RBC SED RATE AUTOMATED: CPT

## 2021-01-01 PROCEDURE — 86225 DNA ANTIBODY NATIVE: CPT

## 2021-01-01 PROCEDURE — 73630 X-RAY EXAM OF FOOT: CPT | Mod: RT

## 2021-01-01 PROCEDURE — 84153 ASSAY OF PSA TOTAL: CPT

## 2021-01-01 PROCEDURE — 36415 COLL VENOUS BLD VENIPUNCTURE: CPT

## 2021-01-01 PROCEDURE — 81342 TRG GENE REARRANGEMENT ANAL: CPT

## 2021-01-01 PROCEDURE — 82306 VITAMIN D 25 HYDROXY: CPT

## 2021-01-01 PROCEDURE — 86431 RHEUMATOID FACTOR QUANT: CPT

## 2021-01-01 PROCEDURE — 86664 EPSTEIN-BARR NUCLEAR ANTIGEN: CPT

## 2021-01-01 PROCEDURE — 86376 MICROSOMAL ANTIBODY EACH: CPT

## 2021-01-01 PROCEDURE — 86235 NUCLEAR ANTIGEN ANTIBODY: CPT | Mod: 91

## 2021-01-01 PROCEDURE — 86160 COMPLEMENT ANTIGEN: CPT | Mod: 91

## 2021-01-01 PROCEDURE — 84999 UNLISTED CHEMISTRY PROCEDURE: CPT

## 2021-01-01 PROCEDURE — 83704 LIPOPROTEIN BLD QUAN PART: CPT

## 2021-01-01 PROCEDURE — 76870 US EXAM SCROTUM: CPT

## 2021-01-01 PROCEDURE — 84439 ASSAY OF FREE THYROXINE: CPT

## 2021-01-01 PROCEDURE — 81340 TRB@ GENE REARRANGE AMPLIFY: CPT

## 2021-01-01 PROCEDURE — 86256 FLUORESCENT ANTIBODY TITER: CPT

## 2021-01-01 PROCEDURE — 81002 URINALYSIS NONAUTO W/O SCOPE: CPT | Performed by: PHYSICIAN ASSISTANT

## 2021-01-01 PROCEDURE — 82139 AMINO ACIDS QUAN 6 OR MORE: CPT

## 2021-01-01 PROCEDURE — 73630 X-RAY EXAM OF FOOT: CPT | Mod: LT

## 2021-01-01 PROCEDURE — 81261 IGH GENE REARRANGE AMP METH: CPT

## 2021-01-01 PROCEDURE — 86663 EPSTEIN-BARR ANTIBODY: CPT

## 2021-01-01 ASSESSMENT — ENCOUNTER SYMPTOMS
MYALGIAS: 0
VOMITING: 0
PALPITATIONS: 0
FLANK PAIN: 1
ABDOMINAL PAIN: 0
FEVER: 0
CHILLS: 0
NAUSEA: 0
HEADACHES: 0
DIZZINESS: 0

## 2021-01-01 ASSESSMENT — FIBROSIS 4 INDEX: FIB4 SCORE: 1.8

## 2021-06-21 NOTE — PROGRESS NOTES
Subjective:   Gonzalez Schreiber is a 70 y.o. male who presents for Other (kidney's hurting on R side, also having R testicle pain x14 days )      HPI:  This is a very pleasant 70-year-old male presenting to the clinic with right-sided flank pain and right testicle pain x14 days.  Patient states his symptoms initially started with right-sided testicular pain.  Described as an achy sensation.  Occasionally feels like a has been punched in the testicle.  When the patient is resting his pain is usually minimal.  Currently in clinic he is in 1/10 pain.  With increased activity and movement his pain increases to approximately a 7/10.  He denies any testicular swelling or discoloration.  Denies any dysuria or penile discharge.  Denies any hematuria.  Over the last few days he has noticed some right-sided flank pain.  This morning the pain started radiating to the right groin.  He does have a history of nephrolithiasis.  He has been tolerating oral intake.  Denies any nausea or vomiting.  Is having normal bowel movements.  He has not been taking any OTC medications for symptoms.         Review of Systems   Constitutional: Negative for chills, fever and malaise/fatigue.   Cardiovascular: Negative for chest pain and palpitations.   Gastrointestinal: Negative for abdominal pain, nausea and vomiting.   Genitourinary: Positive for flank pain (Right-sided flank pain with radiation to the right anterior groin). Negative for dysuria, frequency, hematuria and urgency.        Left-sided testicular pain   Musculoskeletal: Negative for myalgias.   Skin: Negative for rash.   Neurological: Negative for dizziness and headaches.       Medications:    • allopurinol Tabs  • AMINO ACID  • AMINO ACID IV  • Avapro Tabs  • carisoprodol Tabs  • carvedilol Tabs  • clonazePAM Tabs  • GENOTROPIN Solr  • potassium chloride ER  • quiNINE  • terazosin Caps  • thyroid Tabs  • Urocit-K 10 Tbcr    Allergies: Augmentin, Bactrim, Cipro xr, Other drug,  and Synthroid [levothyroxine sodium]    Problem List: Gonzalez Schreiber does not have a problem list on file.    Surgical History:  Past Surgical History:   Procedure Laterality Date   • APPENDECTOMY     • COLON RESECTION CHILD     • HIATAL HERNIA REPAIR     • OTHER      R shoulder   • OTHER ORTHOPEDIC SURGERY      R knee   • TONSILLECTOMY         Past Social Hx: Gonzalez Schreiber  reports that he has quit smoking. His smoking use included cigarettes. He has never used smokeless tobacco. He reports that he does not drink alcohol and does not use drugs.     Past Family Hx:  Gonzalez Schreiber family history is not on file.     Problem list, medications, and allergies reviewed by myself today in Epic.     Objective:     /56   Pulse 76   Temp 37.2 °C (98.9 °F) (Temporal)   Resp 16   Ht 1.829 m (6')   Wt 109 kg (240 lb)   SpO2 100%   BMI 32.55 kg/m²     Physical Exam  Constitutional:       General: He is not in acute distress.     Appearance: Normal appearance. He is not ill-appearing, toxic-appearing or diaphoretic.   HENT:      Head: Normocephalic and atraumatic.   Eyes:      Conjunctiva/sclera: Conjunctivae normal.   Cardiovascular:      Rate and Rhythm: Normal rate and regular rhythm.      Pulses: Normal pulses.      Heart sounds: Normal heart sounds.   Pulmonary:      Effort: Pulmonary effort is normal.      Breath sounds: Normal breath sounds. No wheezing.   Abdominal:      General: Bowel sounds are normal. There is no distension.      Palpations: Abdomen is soft. There is no mass.      Tenderness: There is no abdominal tenderness. There is no right CVA tenderness, left CVA tenderness, guarding or rebound.      Hernia: No hernia is present.   Genitourinary:     Penis: Normal. No discharge.       Testes: Cremasteric reflex is present.         Right: Tenderness present. Swelling not present.         Left: Tenderness or swelling not present.      Epididymis:      Right: Not inflamed or enlarged.  No tenderness.      Left: Not inflamed or enlarged. No tenderness.      Comments: Slight tenderness to palpation over the right testicle.  There is no obvious hydrocele or varicocele present.  No tenderness to the epididymis.  No obvious inguinal hernia palpated.  Musculoskeletal:      Cervical back: Normal range of motion. No muscular tenderness.   Lymphadenopathy:      Cervical: No cervical adenopathy.   Skin:     General: Skin is warm and dry.      Capillary Refill: Capillary refill takes less than 2 seconds.   Neurological:      General: No focal deficit present.      Mental Status: He is alert and oriented to person, place, and time. Mental status is at baseline.   Psychiatric:         Mood and Affect: Mood normal.         Thought Content: Thought content normal.       Lab Results/POC Test Results   Results for orders placed or performed in visit on 06/21/21   POCT Urinalysis   Result Value Ref Range    POC Color Dark Yellow Negative    POC Appearance Cloudy Negative    POC Leukocyte Esterase Negative Negative    POC Nitrites Negative Negative    POC Urobiligen 0.2 Negative (0.2) mg/dL    POC Protein 30 Negative mg/dL    POC Urine PH 7.0 5.0 - 8.0    POC Blood Negative Negative    POC Specific Gravity 1.025 <1.005 - >1.030    POC Ketones Negative Negative mg/dL    POC Bilirubin Negative Negative mg/dL    POC Glucose Negative Negative mg/dL         RADIOLOGY RESULTS   [id number]    Result Date: 6/21/2021 6/21/2021 5:16 PM HISTORY/REASON FOR EXAM: Pain. Right testicular pain TECHNIQUE/EXAM DESCRIPTION: Real-time sonography of the scrotum was performed with gray-scale, color and duplex Doppler imaging. COMPARISON: None FINDINGS: The testes are homogeneous in echotexture and low-resistive waveforms are confirmed bilaterally. No intratesticular mass is seen. Vascular flow is symmetric. The right testis measures 5.0 x 2.8 x 2.8 cm. The left testis measures 4.7 x 2.9 x 2.8 cm There is 2.0 cm multiloculated  left epididymal head cyst. Prominent right vas deferens No abnormal volume hydrocele is detected. No varicocele is detected.     No testicular mass or inflammation identified. A 2.0 cm multiloculated left epididymal head cyst. Prominent right vas deferens.               Assessment/Plan:     Comments/MDM:     • Differential diagnoses and treatment options were discussed with the patient at length today.  Differentials include nephrolithiasis versus UTI versus epididymitis versus prostatic hypertrophy.  On physical exam patient had no CVA tenderness to palpation.  Resting comfortably in clinic.  Mild tenderness to palpation over the right testicle.  No epididymal tenderness or swelling.  Ultrasound was reviewed with the patient.  This demonstrated no evidence of mass or inflammation.  I spoke with the patient on 6/22/2021 states his symptoms are gradually improving.  He is urinating appropriately.  Denies any fevers, chills or myalgias.  Denies any flank pain.  At this time we discussed further imaging versus watchful waiting.  He elected for watchful waiting at this time.  Encouraged to call with questions or concerns.  ER precautions discussed.     Diagnosis and associated orders:     1. Right flank pain  - POCT Urinalysis  - [id number]; Future    2. Pain in right testicle  - POCT Urinalysis  - [id number]; Future             Differential diagnosis, natural history, supportive care, and indications for immediate follow-up discussed.    Advised the patient to follow-up with the primary care physician for recheck, reevaluation, and consideration of further management.    Please note that this dictation was created using voice recognition software. I have made reasonable attempt to correct obvious errors, but I expect that there are errors of grammar and possibly content that I did not discover before finalizing the note.    This note was electronically signed by GILLIAN Dukes PA-C

## 2021-11-19 LAB — TEST NAME 95000: NORMAL
